# Patient Record
Sex: MALE | Race: WHITE | Employment: UNEMPLOYED | URBAN - METROPOLITAN AREA
[De-identification: names, ages, dates, MRNs, and addresses within clinical notes are randomized per-mention and may not be internally consistent; named-entity substitution may affect disease eponyms.]

---
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
## 2021-09-09 ENCOUNTER — OFFICE VISIT (OUTPATIENT)
Dept: PRIMARY CARE CLINIC | Age: 21
End: 2021-09-09
Payer: MEDICAID

## 2021-09-09 VITALS
SYSTOLIC BLOOD PRESSURE: 120 MMHG | OXYGEN SATURATION: 98 % | BODY MASS INDEX: 25.01 KG/M2 | HEIGHT: 68 IN | DIASTOLIC BLOOD PRESSURE: 75 MMHG | HEART RATE: 67 BPM | TEMPERATURE: 97.7 F | WEIGHT: 165 LBS

## 2021-09-09 DIAGNOSIS — L03.113 CELLULITIS OF RIGHT FOREARM: Primary | ICD-10-CM

## 2021-09-09 PROCEDURE — 99203 OFFICE O/P NEW LOW 30 MIN: CPT | Performed by: NURSE PRACTITIONER

## 2021-09-09 RX ORDER — DOXYCYCLINE HYCLATE 100 MG/1
100 CAPSULE ORAL 2 TIMES DAILY
Qty: 14 CAPSULE | Refills: 0 | Status: SHIPPED | OUTPATIENT
Start: 2021-09-09 | End: 2021-09-16

## 2021-09-09 ASSESSMENT — PATIENT HEALTH QUESTIONNAIRE - PHQ9
SUM OF ALL RESPONSES TO PHQ QUESTIONS 1-9: 0
SUM OF ALL RESPONSES TO PHQ9 QUESTIONS 1 & 2: 0
SUM OF ALL RESPONSES TO PHQ QUESTIONS 1-9: 0
SUM OF ALL RESPONSES TO PHQ QUESTIONS 1-9: 0
2. FEELING DOWN, DEPRESSED OR HOPELESS: 0
1. LITTLE INTEREST OR PLEASURE IN DOING THINGS: 0

## 2021-09-09 NOTE — PROGRESS NOTES
[facility] IN [facility]  [address] [address]  Dept: [phone]  Dept Fax: [phone]     Lewis Young is a 24 y.o. male who presents to the urgent care today for his medicalconditions/complaints as noted below. Lewis Young is c/o of Rash (on rt elbow - noticed it yesterday morning)    HPI:      Rash  This is a new problem. Episode onset: 2 days ago. The problem has been gradually worsening since onset. Location: right forearm. The rash is characterized by redness and pain. He was exposed to an insect bite/sting. Pertinent negatives include no cough, fatigue, fever or shortness of breath. Past treatments include nothing. The treatment provided no relief. Has history of abscesses. Denies any history of MRSA, that he is aware of. No past medical history on file. Current Outpatient Medications   Medication Sig Dispense Refill    doxycycline hyclate (VIBRAMYCIN) 100 MG capsule Take 1 capsule by mouth 2 times daily for 7 days 14 capsule 0    predniSONE (DELTASONE) 20 MG tablet Take 2 tablets (40 mg) for one week, followed by 1 tablet (20 mg) for one week (Patient not taking: Reported on 9/9/2021) 21 tablet 0     No current facility-administered medications for this visit. Facility-Administered Medications Ordered in Other Visits   Medication Dose Route Frequency Provider Last Rate Last Admin    proparacaine (ALCAINE) 0.5 % ophthalmic solution 2 drop  2 drop Left Eye Once Zunilda Chinchilla APRN - CNP        eye wash ophthalmic solution 1 drop  1 drop Both Eyes Once Zunilda Chinchilla APRN - CNP         No Known Allergies    Subjective:      Review of Systems   Constitutional: Negative for chills, fatigue and fever. Respiratory: Negative. Negative for cough, chest tightness, shortness of breath and wheezing. Cardiovascular: Negative. Negative for chest pain. Skin: Positive for rash (right forearm).    All other

## 2021-09-09 NOTE — PATIENT INSTRUCTIONS
Patient Education        Cellulitis: Care Instructions  Your Care Instructions     Cellulitis is a skin infection caused by bacteria, most often strep or staph. It often occurs after a break in the skin from a scrape, cut, bite, or puncture, or after a rash. Cellulitis may be treated without doing tests to find out what caused it. But your doctor may do tests, if needed, to look for a specific bacteria, like methicillin-resistant Staphylococcus aureus (MRSA). The doctor has checked you carefully, but problems can develop later. If you notice any problems or new symptoms, get medical treatment right away. Follow-up care is a key part of your treatment and safety. Be sure to make and go to all appointments, and call your doctor if you are having problems. It's also a good idea to know your test results and keep a list of the medicines you take. How can you care for yourself at home? · Take your antibiotics as directed. Do not stop taking them just because you feel better. You need to take the full course of antibiotics. · Prop up the infected area on pillows to reduce pain and swelling. Try to keep the area above the level of your heart as often as you can. · If your doctor told you how to care for your wound, follow your doctor's instructions. If you did not get instructions, follow this general advice:  ? Wash the wound with clean water 2 times a day. Don't use hydrogen peroxide or alcohol, which can slow healing. ? You may cover the wound with a thin layer of petroleum jelly, such as Vaseline, and a nonstick bandage. ? Apply more petroleum jelly and replace the bandage as needed. · Be safe with medicines. Take pain medicines exactly as directed. ? If the doctor gave you a prescription medicine for pain, take it as prescribed. ? If you are not taking a prescription pain medicine, ask your doctor if you can take an over-the-counter medicine.   To prevent cellulitis in the future  · Try to prevent cuts, scrapes, or other injuries to your skin. Cellulitis most often occurs where there is a break in the skin. · If you get a scrape, cut, mild burn, or bite, wash the wound with clean water as soon as you can to help avoid infection. Don't use hydrogen peroxide or alcohol, which can slow healing. · If you have swelling in your legs (edema), support stockings and good skin care may help prevent leg sores and cellulitis. · Take care of your feet, especially if you have diabetes or other conditions that increase the risk of infection. Wear shoes and socks. Do not go barefoot. If you have athlete's foot or other skin problems on your feet, talk to your doctor about how to treat them. When should you call for help? Call your doctor now or seek immediate medical care if:    · You have signs that your infection is getting worse, such as:  ? Increased pain, swelling, warmth, or redness. ? Red streaks leading from the area. ? Pus draining from the area. ? A fever.     · You get a rash. Watch closely for changes in your health, and be sure to contact your doctor if:    · You do not get better as expected. Where can you learn more? Go to https://etaskr."[employer]. org and sign in to your [employer] account. Enter Y464 in the [facility] box to learn more about \"Cellulitis: Care Instructions. \"     If you do not have an account, please click on the \"Sign Up Now\" link. Current as of: March 3, 2021               Content Version: 12.9  © 2006-2021 [employer]wise, [facility]. Care instructions adapted under license by [facility] ([facility]). If you have questions about a medical condition or this instruction, always ask your healthcare professional. [place] [address] any warranty or liability for your use of this information.

## 2021-09-10 ASSESSMENT — ENCOUNTER SYMPTOMS
SHORTNESS OF BREATH: 0
RESPIRATORY NEGATIVE: 1
COUGH: 0
CHEST TIGHTNESS: 0
WHEEZING: 0

## 2021-11-30 ENCOUNTER — HOSPITAL ENCOUNTER (EMERGENCY)
Age: 21
Discharge: HOME | End: 2021-11-30
Payer: MEDICAID

## 2021-11-30 VITALS
BODY MASS INDEX: 23.49 KG/M2 | RESPIRATION RATE: 18 BRPM | HEART RATE: 87 BPM | WEIGHT: 155 LBS | SYSTOLIC BLOOD PRESSURE: 133 MMHG | DIASTOLIC BLOOD PRESSURE: 83 MMHG | TEMPERATURE: 98.1 F | OXYGEN SATURATION: 98 % | HEIGHT: 68 IN

## 2021-11-30 DIAGNOSIS — L03.116 CELLULITIS OF LEFT LOWER EXTREMITY: Primary | ICD-10-CM

## 2021-11-30 PROCEDURE — 99203 OFFICE O/P NEW LOW 30 MIN: CPT

## 2021-11-30 PROCEDURE — 99203 OFFICE O/P NEW LOW 30 MIN: CPT | Performed by: NURSE PRACTITIONER

## 2021-11-30 RX ORDER — CLINDAMYCIN HYDROCHLORIDE 300 MG/1
300 CAPSULE ORAL 3 TIMES DAILY
Qty: 30 CAPSULE | Refills: 0 | Status: SHIPPED | OUTPATIENT
Start: 2021-11-30 | End: 2021-12-10

## 2021-11-30 RX ORDER — DIPHENHYDRAMINE HCL 25 MG
50 TABLET ORAL EVERY 6 HOURS PRN
COMMUNITY

## 2021-11-30 RX ORDER — OMEPRAZOLE 10 MG/1
10 CAPSULE, DELAYED RELEASE ORAL DAILY
COMMUNITY

## 2021-11-30 ASSESSMENT — ENCOUNTER SYMPTOMS
VOMITING: 0
SORE THROAT: 0
COUGH: 0
NAUSEA: 0
SHORTNESS OF BREATH: 0

## 2021-12-01 NOTE — ED PROVIDER NOTES
[facility]  Urgent Care Encounter       CHIEF COMPLAINT       Chief Complaint   Patient presents with    Insect Bite       Nurses Notes reviewed and I agree except as noted in the HPI. HISTORY OF PRESENT ILLNESS   Lawyer Chanel is a 24 y.o. male who presents for evaluation of redness and swelling below the right eye, to the left foot, and to the left thigh. Patient states that the left thigh is most concerning as this is the largest.  States that he has been running out in the woods and is concerned that he could have had insect bites that got infected. States that he first noticed this today. He denies any fever or chills. He denies any significant itching. He states that he has been taking Benadryl at home which has not provided much relief. The history is provided by the patient. REVIEW OF SYSTEMS     Review of Systems   Constitutional: Negative for chills and fever. HENT: Negative for congestion and sore throat. Respiratory: Negative for cough and shortness of breath. Cardiovascular: Negative for chest pain. Gastrointestinal: Negative for nausea and vomiting. Musculoskeletal: Negative for arthralgias and joint swelling. Skin: Positive for rash. Neurological: Negative for headaches. PAST MEDICAL HISTORY   History reviewed. No pertinent past medical history. SURGICALHISTORY     Patient  has a past surgical history that includes Adenoidectomy. CURRENT MEDICATIONS       Previous Medications    DIPHENHYDRAMINE (BENADRYL) 25 MG TABLET    Take 50 mg by mouth every 6 hours as needed for Itching    OMEPRAZOLE (PRILOSEC) 10 MG DELAYED RELEASE CAPSULE    Take 10 mg by mouth daily       ALLERGIES     Patient is is allergic to doxycycline. Patients   There is no immunization history on file for this patient. FAMILY HISTORY     Patient's family history includes Anxiety Disorder in his father and mother; Depression in his mother.     SOCIAL HISTORY     Patient  reports that he has never smoked. He has never used smokeless tobacco. He reports that he does not drink alcohol and does not use drugs. PHYSICAL EXAM     ED TRIAGE VITALS  BP: 133/83, Temp: 98.1 °F (36.7 °C), Pulse: 87, Resp: 18, SpO2: 98 %,Estimated body mass index is 23.57 kg/m² as calculated from the following:    Height as of this encounter: 5' 8\" (1.727 m). Weight as of this encounter: 155 lb (70.3 kg). ,No LMP for male patient. Physical Exam  Vitals and nursing note reviewed. Constitutional:       General: He is not in acute distress. Appearance: He is well-developed. He is not diaphoretic. Eyes:      Conjunctiva/sclera:      Right eye: Right conjunctiva is not injected. Left eye: Left conjunctiva is not injected. Pupils: Pupils are equal.   Cardiovascular:      Rate and Rhythm: Normal rate and regular rhythm. Heart sounds: No murmur heard. Pulmonary:      Effort: Pulmonary effort is normal. No respiratory distress. Breath sounds: Normal breath sounds. Musculoskeletal:      Cervical back: Normal range of motion. Right knee: Normal range of motion. Left knee: Normal range of motion. Skin:     General: Skin is warm. Findings: Erythema present. No rash. Comments: Small area of erythema noted to the left foot as well as below the right eye, however a 15 x 11 cm area of erythema is noted to the anterior left thigh. Neurological:      Mental Status: He is alert and oriented to person, place, and time. Psychiatric:         Behavior: Behavior normal.         DIAGNOSTIC RESULTS     Labs:No results found for this visit on 11/30/21.     IMAGING:    No orders to display         EKG:      URGENT CARE COURSE:     Vitals:    11/30/21 1942   BP: 133/83   Pulse: 87   Resp: 18   Temp: 98.1 °F (36.7 °C)   SpO2: 98%   Weight: 155 lb (70.3 kg)   Height: 5' 8\" (1.727 m)       Medications - No data to display         PROCEDURES:  None    FINAL IMPRESSION      1. Cellulitis of left lower extremity          DISPOSITION/ PLAN     Exam is consistent with cellulitis. I discussed with the patient we will plan to treat with oral antibiotics and he is advised to follow-up on an outpatient basis if the symptoms do not improve or worsen. He is agreeable to plan as discussed.       PATIENT REFERRED TO:  VIOLA Montero CNP  [address] [id number]      DISCHARGE MEDICATIONS:  New Prescriptions    CLINDAMYCIN (CLEOCIN) 300 MG CAPSULE    Take 1 capsule by mouth 3 times daily for 10 days       Discontinued Medications    PREDNISONE (DELTASONE) 20 MG TABLET    Take 2 tablets (40 mg) for one week, followed by 1 tablet (20 mg) for one week       Current Discharge Medication List          VIOLA Fuchs CNP    (Please note that portions of this note were completed with a voice recognition program. Efforts were made to edit the dictations but occasionally words are mis-transcribed.)          VIOLA Fuchs CNP  11/30/21 1955

## 2021-12-01 NOTE — ED TRIAGE NOTES
To room with c/o bug bites to right cheek, left lower abd, left upper thigh, and right foot that he noticed today. Itching and warm. Hunting in the woods all day. He did not see a bug.